# Patient Record
Sex: FEMALE | Race: WHITE | URBAN - METROPOLITAN AREA
[De-identification: names, ages, dates, MRNs, and addresses within clinical notes are randomized per-mention and may not be internally consistent; named-entity substitution may affect disease eponyms.]

---

## 2018-08-09 ENCOUNTER — OFFICE VISIT (OUTPATIENT)
Dept: URBAN - METROPOLITAN AREA CLINIC 18 | Facility: CLINIC | Age: 63
End: 2018-08-09
Payer: COMMERCIAL

## 2018-08-09 DIAGNOSIS — E11.9 TYPE 2 DIABETES MELLITUS W/O COMPLICATION: Primary | ICD-10-CM

## 2018-08-09 DIAGNOSIS — H40.033 ANATOMICAL NARROW ANGLE, BILATERAL: ICD-10-CM

## 2018-08-09 DIAGNOSIS — H25.13 AGE-RELATED NUCLEAR CATARACT, BILATERAL: ICD-10-CM

## 2018-08-09 PROCEDURE — 99203 OFFICE O/P NEW LOW 30 MIN: CPT | Performed by: OPTOMETRIST

## 2018-08-09 RX ORDER — MULTIVIT-MIN/FA/LYCOPEN/LUTEIN .4-300-25
TABLET ORAL
Qty: 0 | Refills: 0 | Status: ACTIVE
Start: 2018-08-09

## 2018-08-09 RX ORDER — GABAPENTIN 400 MG/1
400 MG CAPSULE ORAL
Qty: 0 | Refills: 0 | Status: ACTIVE
Start: 2018-08-09

## 2018-08-09 RX ORDER — OXYCODONE HYDROCHLORIDE 5 MG/1
5 MG TABLET ORAL
Qty: 0 | Refills: 0 | Status: ACTIVE
Start: 2018-08-09

## 2018-08-09 RX ORDER — ATENOLOL 25 MG/1
25 MG TABLET ORAL
Qty: 0 | Refills: 0 | Status: ACTIVE
Start: 2018-08-09

## 2018-08-09 RX ORDER — ASPIRIN 325 MG/1
325 MG TABLET ORAL
Qty: 0 | Refills: 0 | Status: ACTIVE
Start: 2018-08-09

## 2018-08-09 RX ORDER — CEPHALEXIN 500 MG/1
500 MG CAPSULE ORAL
Qty: 0 | Refills: 0 | Status: ACTIVE
Start: 2018-08-09

## 2018-08-09 RX ORDER — TRAMADOL HYDROCHLORIDE 200 MG/1
200 MG CAPSULE ORAL
Qty: 0 | Refills: 0 | Status: ACTIVE
Start: 2018-08-09

## 2018-08-09 RX ORDER — AMLODIPINE BESYLATE 2.5 MG/1
2.5 MG TABLET ORAL
Qty: 0 | Refills: 0 | Status: ACTIVE
Start: 2018-08-09

## 2018-08-09 ASSESSMENT — VISUAL ACUITY
OS: 20/30
OD: 20/30

## 2018-08-09 ASSESSMENT — INTRAOCULAR PRESSURE
OS: 11
OD: 11

## 2018-08-09 ASSESSMENT — KERATOMETRY
OD: 41.50
OS: 42.13

## 2018-08-09 NOTE — IMPRESSION/PLAN
Impression: Type 2 diabetes mellitus w/o complication: U59.3. Plan: Diabetes type II: no background retinopathy, no signs of neovascularization noted. Discussed ocular and systemic benefits of blood sugar control.

## 2018-08-09 NOTE — IMPRESSION/PLAN
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 S/P LPI OU Plan: Discussed diagnosis in detail with patient. Advised patient of condition. No treatment is required at this time. Will continue to observe condition and or symptoms.